# Patient Record
Sex: FEMALE
[De-identification: names, ages, dates, MRNs, and addresses within clinical notes are randomized per-mention and may not be internally consistent; named-entity substitution may affect disease eponyms.]

---

## 2018-05-07 PROBLEM — Z00.00 ENCOUNTER FOR PREVENTIVE HEALTH EXAMINATION: Status: ACTIVE | Noted: 2018-05-07

## 2018-05-31 ENCOUNTER — APPOINTMENT (OUTPATIENT)
Dept: OPHTHALMOLOGY | Facility: CLINIC | Age: 53
End: 2018-05-31

## 2025-03-15 ENCOUNTER — EMERGENCY (EMERGENCY)
Facility: HOSPITAL | Age: 60
LOS: 1 days | Discharge: HOME | End: 2025-03-15
Attending: STUDENT IN AN ORGANIZED HEALTH CARE EDUCATION/TRAINING PROGRAM
Payer: COMMERCIAL

## 2025-03-15 ENCOUNTER — NON-APPOINTMENT (OUTPATIENT)
Age: 60
End: 2025-03-15

## 2025-03-15 VITALS
WEIGHT: 199.96 LBS | SYSTOLIC BLOOD PRESSURE: 111 MMHG | HEART RATE: 80 BPM | DIASTOLIC BLOOD PRESSURE: 78 MMHG | HEIGHT: 61.5 IN | TEMPERATURE: 98 F | OXYGEN SATURATION: 100 % | RESPIRATION RATE: 18 BRPM

## 2025-03-15 VITALS
OXYGEN SATURATION: 99 % | DIASTOLIC BLOOD PRESSURE: 67 MMHG | HEART RATE: 73 BPM | RESPIRATION RATE: 17 BRPM | TEMPERATURE: 98 F | SYSTOLIC BLOOD PRESSURE: 126 MMHG

## 2025-03-15 LAB
ALBUMIN SERPL ELPH-MCNC: 4.1 G/DL — SIGNIFICANT CHANGE UP (ref 3.3–5)
ALP SERPL-CCNC: 77 U/L — SIGNIFICANT CHANGE UP (ref 40–120)
ALT FLD-CCNC: 16 U/L — SIGNIFICANT CHANGE UP (ref 10–45)
ANION GAP SERPL CALC-SCNC: 13 MMOL/L — SIGNIFICANT CHANGE UP (ref 5–17)
APTT BLD: 42.3 SEC — HIGH (ref 24.5–35.6)
AST SERPL-CCNC: 26 U/L — SIGNIFICANT CHANGE UP (ref 10–40)
BASOPHILS # BLD AUTO: 0.06 K/UL — SIGNIFICANT CHANGE UP (ref 0–0.2)
BASOPHILS NFR BLD AUTO: 0.8 % — SIGNIFICANT CHANGE UP (ref 0–2)
BILIRUB SERPL-MCNC: 0.2 MG/DL — SIGNIFICANT CHANGE UP (ref 0.2–1.2)
BUN SERPL-MCNC: 15 MG/DL — SIGNIFICANT CHANGE UP (ref 7–23)
CALCIUM SERPL-MCNC: 9.5 MG/DL — SIGNIFICANT CHANGE UP (ref 8.4–10.5)
CHLORIDE SERPL-SCNC: 104 MMOL/L — SIGNIFICANT CHANGE UP (ref 96–108)
CO2 SERPL-SCNC: 20 MMOL/L — LOW (ref 22–31)
CREAT SERPL-MCNC: 0.67 MG/DL — SIGNIFICANT CHANGE UP (ref 0.5–1.3)
EGFR: 101 ML/MIN/1.73M2 — SIGNIFICANT CHANGE UP
EGFR: 101 ML/MIN/1.73M2 — SIGNIFICANT CHANGE UP
EOSINOPHIL # BLD AUTO: 0.19 K/UL — SIGNIFICANT CHANGE UP (ref 0–0.5)
EOSINOPHIL NFR BLD AUTO: 2.5 % — SIGNIFICANT CHANGE UP (ref 0–6)
GLUCOSE SERPL-MCNC: 117 MG/DL — HIGH (ref 70–99)
HCT VFR BLD CALC: 43.9 % — SIGNIFICANT CHANGE UP (ref 34.5–45)
HGB BLD-MCNC: 15 G/DL — SIGNIFICANT CHANGE UP (ref 11.5–15.5)
IMM GRANULOCYTES NFR BLD AUTO: 0.1 % — SIGNIFICANT CHANGE UP (ref 0–0.9)
INR BLD: 0.91 RATIO — SIGNIFICANT CHANGE UP (ref 0.85–1.16)
LYMPHOCYTES # BLD AUTO: 3.02 K/UL — SIGNIFICANT CHANGE UP (ref 1–3.3)
LYMPHOCYTES # BLD AUTO: 39.3 % — SIGNIFICANT CHANGE UP (ref 13–44)
MCHC RBC-ENTMCNC: 31.5 PG — SIGNIFICANT CHANGE UP (ref 27–34)
MCHC RBC-ENTMCNC: 34.2 G/DL — SIGNIFICANT CHANGE UP (ref 32–36)
MCV RBC AUTO: 92.2 FL — SIGNIFICANT CHANGE UP (ref 80–100)
MONOCYTES # BLD AUTO: 0.31 K/UL — SIGNIFICANT CHANGE UP (ref 0–0.9)
MONOCYTES NFR BLD AUTO: 4 % — SIGNIFICANT CHANGE UP (ref 2–14)
NEUTROPHILS # BLD AUTO: 4.09 K/UL — SIGNIFICANT CHANGE UP (ref 1.8–7.4)
NEUTROPHILS NFR BLD AUTO: 53.3 % — SIGNIFICANT CHANGE UP (ref 43–77)
NRBC BLD AUTO-RTO: 0 /100 WBCS — SIGNIFICANT CHANGE UP (ref 0–0)
PLATELET # BLD AUTO: 311 K/UL — SIGNIFICANT CHANGE UP (ref 150–400)
POTASSIUM SERPL-MCNC: 5 MMOL/L — SIGNIFICANT CHANGE UP (ref 3.5–5.3)
POTASSIUM SERPL-SCNC: 5 MMOL/L — SIGNIFICANT CHANGE UP (ref 3.5–5.3)
PROT SERPL-MCNC: 7.1 G/DL — SIGNIFICANT CHANGE UP (ref 6–8.3)
PROTHROM AB SERPL-ACNC: 10.5 SEC — SIGNIFICANT CHANGE UP (ref 9.9–13.4)
RBC # BLD: 4.76 M/UL — SIGNIFICANT CHANGE UP (ref 3.8–5.2)
RBC # FLD: 13.4 % — SIGNIFICANT CHANGE UP (ref 10.3–14.5)
SODIUM SERPL-SCNC: 137 MMOL/L — SIGNIFICANT CHANGE UP (ref 135–145)
TROPONIN T, HIGH SENSITIVITY RESULT: <6 NG/L — SIGNIFICANT CHANGE UP (ref 0–51)
WBC # BLD: 7.68 K/UL — SIGNIFICANT CHANGE UP (ref 3.8–10.5)
WBC # FLD AUTO: 7.68 K/UL — SIGNIFICANT CHANGE UP (ref 3.8–10.5)

## 2025-03-15 RX ORDER — ERYTHROMYCIN 5 MG/G
1 OINTMENT OPHTHALMIC ONCE
Refills: 0 | Status: COMPLETED | OUTPATIENT
Start: 2025-03-15 | End: 2025-03-15

## 2025-03-15 RX ORDER — PREDNISONE 20 MG/1
60 TABLET ORAL ONCE
Refills: 0 | Status: COMPLETED | OUTPATIENT
Start: 2025-03-15 | End: 2025-03-15

## 2025-03-15 RX ORDER — ERYTHROMYCIN 5 MG/G
1 OINTMENT OPHTHALMIC
Qty: 1 | Refills: 0
Start: 2025-03-15 | End: 2025-03-19

## 2025-03-15 RX ORDER — PREDNISONE 20 MG/1
3 TABLET ORAL
Qty: 12 | Refills: 0
Start: 2025-03-15 | End: 2025-03-18

## 2025-03-15 RX ADMIN — ERYTHROMYCIN 1 APPLICATION(S): 5 OINTMENT OPHTHALMIC at 14:50

## 2025-03-15 RX ADMIN — Medication 1000 MILLIGRAM(S): at 14:50

## 2025-03-15 RX ADMIN — PREDNISONE 60 MILLIGRAM(S): 20 TABLET ORAL at 14:50

## 2025-03-15 NOTE — CONSULT NOTE ADULT - ASSESSMENT
INCOMPLETE    Assessment: ***. Initial VS in ED: ***. Exam: ***.      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    Recommendations: INCOMPLETE  [] MRI brain without contrast INCOMPLETE  [] TTE w/ bubble   [] Lipid panel, HbA1c  [] CBC, CMP, coagulation panel, troponin  [] ASA 81 mg PO (325 per rectum if fails dysphagia) INCOMPLETE  [] Atorvastatin 80mg (titrate to LDL < 70) or rosuvastatin equivalent  [] DVT prophylaxis per primary team  [] NPO unless passes dysphagia screen; swallow eval if fails  [] Keep BP permissive up to 220/110 for 48 hours followed by gradual normotension over 2-3 days   [] Telemonitoring  [] Neurological checks and vital signs per unit protocol  [] BGM goals 140-180  [] PT/OT; S/S evaluation    * Case and plan not final until Attending attestation * INCOMPLETE    Assessment: ***. Initial VS in ED: ***. Exam: ***.      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 House-Brackmann III.    Recommendations:   [] Can check for lyme, HSV   [] Treatment of Bell's Palsy per primary team  [] Can do prednisone 60-80mg per day x 7d  [] use artificial tear drops during the day  [] use ointment and tape the eyelid closed when going to sleep  [] Rest of care per primary team  [] No further inpatient neuro workup at this time    Case and plan discussed with stroke fellow.   * Case and plan not final until Attending attestation * Assessment:   59y (1965) woman, RH, with hx HTN, HLD, who presents to the ED for worsening R facial droop. Patient reports on Thursday night, she felt her taste was off. Yesterday 3/15/25 at 1600, she began to notice her right side lower face was not moving as well as usual. She came in to the ED today because she felt the facial droop was worsening. Patient reports she never had these symptoms in the past. She denies headaches, changes in vision, smell, hearing, taste, sore throat, runny nose, chest pain, pain in the stomach or back, pain in the arms or legs, weakness in the arms or legs, difficulty walking, fevers, chills, nausea, vomiting, diarrhea, constipation, changes in urination. Patient takes amlodipine and rosuvastatin at home. She smokes 1 ppd, since 40 years. No recent travel.     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 House-Brackmann III.    Recommendations:   [] Can check for lyme, HSV   [] Treatment of Bell's Palsy per primary team  [] Can do prednisone 60-80mg per day x 7d  [] use artificial tear drops during the day  [] use ointment and tape the eyelid closed when going to sleep  [] Rest of care per primary team (thyromegaly seen on imaging)  [] No further inpatient neuro workup at this time    Case and plan discussed with stroke fellow.   * Case and plan not final until Attending attestation *

## 2025-03-15 NOTE — ED PROVIDER NOTE - OBJECTIVE STATEMENT
59Y F PMH HTN, HLD, smoker presenting with facial droop. Patient reports that she noticed change in taste sensation on R side of tongue along with mild dysarthria last night. This morning felt that entire R side of mouth was numb with R sided facial droop. No headaches or vision changes. Denies recent illness or sick contacts.

## 2025-03-15 NOTE — ED PROVIDER NOTE - PHYSICAL EXAMINATION
awake and alert oriented x3, has CTAB, pt w/ R sided decreased blink, pt w/ facial weakness w/ droop on the R side, pt w/ EOMI, pupils are 4 mm and reactive, pt w/ no visual field defect, pt w/ equal sensation in the face, 5/5 upper and lower extremity strength, intact sensation in b/l arms/legs, pt w/ intact gait

## 2025-03-15 NOTE — ED ADULT NURSE NOTE - OBJECTIVE STATEMENT
60 y/o F with PMH of daily smoker, HTN, presents to the ED with complaints of R facial droop since yesterday 4pm. Pt denies numbness and tingling, weakness, chest pain, SOB. Pt A&Ox3 gross neuro intact, no difficulty speaking in complete sentences, pulses x 4, bowling x4, abdomen soft nontender nondistended, skin intact. side rails up and bed in lowest position

## 2025-03-15 NOTE — ED PROVIDER NOTE - PROGRESS NOTE DETAILS
reviewed test results w/ patient informed by nursing that they marcela the wrong tube color for the testing for HSV and lyme pt called and she states that she will return for testing reviewed test results w/ patient. pt aware of findings need to follow up with thyroid mass, abnormal EKG, and osteoarthritis

## 2025-03-15 NOTE — CONSULT NOTE ADULT - SUBJECTIVE AND OBJECTIVE BOX
Neurology - Consult Note    -  Spectra: [phone] ([facility]), [phone] ([facility])  -    HPI: Patient RENATA ADLER is a 59y (1965) wo/man with a PMHx significant for ***      Review of Systems:  INCOMPLETE   CONSTITUTIONAL: No fevers or chills  EYES AND ENT: No visual changes or no throat pain   NECK: No pain or stiffness  RESPIRATORY: No hemoptysis or shortness of breath  CARDIOVASCULAR: No chest pain or palpitations  GASTROINTESTINAL: No melena or hematochezia  GENITOURINARY: No dysuria or hematuria  NEUROLOGICAL: +As stated in HPI above  SKIN: No itching, burning, rashes, or lesions   All other review of systems is negative unless indicated above.    Allergies:  No Known Allergies      PMHx/PSHx/Family Hx: As above, otherwise see below       Social Hx:  No current use of tobacco, alcohol, or illicit drugs  Lives with ***    Medications:  MEDICATIONS  (STANDING):    MEDICATIONS  (PRN):      Vitals:  T(C): 36.5 (03-15-25 @ 13:12), Max: 36.5 (03-15-25 @ 13:12)  HR: 80 (03-15-25 @ 13:12) (80 - 80)  BP: 111/78 (03-15-25 @ 13:12) (111/78 - 111/78)  RR: 18 (03-15-25 @ 13:12) (18 - 18)  SpO2: 100% (03-15-25 @ 13:12) (100% - 100%)    Physical Examination: INCOMPLETE  General - NAD  Cardiovascular - Peripheral pulses palpable, no edema  Eyes - Fundoscopy with flat, sharp optic discs and no hemorrhage or exudates; Fundoscopy not well visualized; Fundoscopy not performed due to safety precautions in the setting of the COVID-19 pandemic    Neurologic Exam:  Mental status - Awake, Alert, Oriented to person, place, and time. Speech fluent, repetition and naming intact. Follows simple and complex commands. Attention/concentration, recent and remote memory (including registration and recall), and fund of knowledge intact    Cranial nerves - PERRLA, VFF, EOMI, face sensation (V1-V3) intact b/l, facial strength intact without asymmetry b/l, hearing intact b/l, palate with symmetric elevation, trapezius OR sternocleidomastiod 5/5 strength b/l, tongue midline on protrusion with full lateral movement    Motor - Normal bulk and tone throughout. No pronator drift.  Strength testing            Deltoid      Biceps      Triceps     Wrist Extension    Wrist Flexion     Interossei         R            5                 5               5                     5                              5                        5                 5  L             5                 5               5                     5                              5                        5                 5              Hip Flexion    Hip Extension    Knee Flexion    Knee Extension    Dorsiflexion    Plantar Flexion  R              5                           5                       5                           5                            5                          5  L              5                           5                        5                           5                            5                          5    Sensation - Light touch/temperature OR pain/vibration intact throughout    DTR's -             Biceps      Triceps     Brachioradialis      Patellar    Ankle    Toes/plantar response  R             2+             2+                  2+                       2+            2+                 Down  L              2+             2+                 2+                        2+           2+                 Down    Coordination - Finger to Nose intact b/l. No tremors appreciated    Gait and station - Normal casual gait. Romberg (-)    Labs:          CAPILLARY BLOOD GLUCOSE      POCT Blood Glucose.: 125 mg/dL (15 Mar 2025 13:16)          CSF:                  Radiology:     Neurology - Consult Note    -  Spectra: [phone] ([facility]), [phone] ([facility])  -    HPI: Patient RENATA ADLER is a 59y (1965) woman, RH, with hx HTN, HLD, who presents to the ED for worsening R facial droop. Patient reports on Thursday night, she felt her taste was off. Yesterday 3/15/25 at 1600, she began to notice her right side lower face was not moving as well as usual. She came in to the ED today because she felt the facial droop was worsening. Patient reports she never had these symptoms in the past. She denies headaches, changes in vision, smell, hearing, taste, sore throat, runny nose, chest pain, pain in the stomach or back, pain in the arms or legs, weakness in the arms or legs, difficulty walking, fevers, chills, nausea, vomiting, diarrhea, constipation, changes in urination. Patient takes amlodipine and rosuvastatin at home. She smokes 1 ppd, since 40 years. No recent travel.     Review of Systems:   CONSTITUTIONAL: No fevers or chills  EYES AND ENT: No visual changes or no throat pain   NECK: No pain or stiffness  RESPIRATORY: No hemoptysis or shortness of breath  CARDIOVASCULAR: No chest pain or palpitations  GASTROINTESTINAL: No melena or hematochezia  GENITOURINARY: No dysuria or hematuria  NEUROLOGICAL: +As stated in HPI above  SKIN: No itching, burning, rashes, or lesions   All other review of systems is negative unless indicated above.    Allergies:  No Known Allergies      PMHx/PSHx/Family Hx: As above, otherwise see below       Social Hx:  Smokes 40 pack years    Medications:  MEDICATIONS  (STANDING):    MEDICATIONS  (PRN):      Vitals:  T(C): 36.5 (03-15-25 @ 13:12), Max: 36.5 (03-15-25 @ 13:12)  HR: 80 (03-15-25 @ 13:12) (80 - 80)  BP: 111/78 (03-15-25 @ 13:12) (111/78 - 111/78)  RR: 18 (03-15-25 @ 13:12) (18 - 18)  SpO2: 100% (03-15-25 @ 13:12) (100% - 100%)    Physical Examination:   General - NAD     Neurologic Exam:  Mental status - Awake, Alert, Oriented to person, place, and time. Speech fluent, repetition and naming intact. Follows simple  commands. Attention intact    Cranial nerves - PERRLA, VFF, EOMI, face sensation (V1-V3) intact b/l, facial strength significant R lower face weakness at rest/while talking or smiling, slight R upper face weakness on eyebrow raise , hearing impaired somewhat on the R, palate with symmetric elevation, trapezius  5/5 strength b/l, tongue midline on protrusion with full lateral movement    Motor - Normal bulk and tone throughout. No pronator drift.  5/5 b/l  LE 5/5 b/l throughout    Sensation - Light touch intact throughout    DTR's - deferred    Coordination - Finger to Nose intact b/l. No tremors appreciated    Gait and station - Normal casual gait. Normal tiptoe and heel walk.    Labs:          CAPILLARY BLOOD GLUCOSE      POCT Blood Glucose.: 125 mg/dL (15 Mar 2025 13:16)          CSF:                  Radiology:    CTH/CTA/CTP  1. No acute intracranial hemorrhage.  2. No retrievable clot or hemodynamically significant stenoses.  3. MRI would be more sensitive for acute ischemia.  4. Cervical degenerative changes and left thyroid nodule.  There is thyromegaly with a vague left thyroid mass measuring less than 1.7 x 1.5 x 1.8 cm, although the dimensions of the lesion are somewhat arbitrary due to its indistinct margins.

## 2025-03-15 NOTE — ED PROVIDER NOTE - PATIENT PORTAL LINK FT
You can access the FollowMyHealth Patient Portal offered by [facility] by registering at the following website: [email or web address]. By joining [facility] FollowMyHealth portal, you will also be able to view your health information using other applications (apps) compatible with our system.

## 2025-03-15 NOTE — ED PROVIDER NOTE - NSFOLLOWUPCLINICS_GEN_ALL_ED_FT
[facility] - Ophthalmology  Ophthalmology  [address], [address]  [place] [address]  Phone: [phone]  Fax:

## 2025-03-15 NOTE — ED PROVIDER NOTE - ATTENDING CONTRIBUTION TO CARE
59 F w/ hx of HTN, HLD smoker, presents to the ER w/ R facial droop change in sensation in food taste, w/ diminished blinking ability on the R. Pt w/ no cp, no sob, no nausea no vomiting, no headache. On exam, pt is awake and alert oriented x3, has CTAB, pt w/ 59 F w/ hx of HTN, HLD smoker, presents to the ER w/ R facial droop change in sensation in food taste, w/ diminished blinking ability on the R. Pt w/ no cp, no sob, no nausea no vomiting, no headache. On exam, pt is awake and alert oriented x3, has CTAB, pt w/ R sided decreased blink, pt w/ facial weakness w/ droop on the R side, pt w/ EOMI, pupils are 4 mm and reactive, pt w/ no visual field defect, pt w/ equal sensation in the face, 5/5 upper and lower extremity strength, intact sensation in b/l arms/legs, pt w/ intact gait. pt was initially made a code stroke, findings likely c/w w/ bells palsy. plan for labs and imaging w/ reassessment likely isolated bells palsy, low suspicion for acute ischemic stroke, neurology agrees

## 2025-03-15 NOTE — ED ADULT NURSE NOTE - NSFALLUNIVINTERV_ED_ALL_ED
Bed/Stretcher in lowest position, wheels locked, appropriate side rails in place/Call bell, personal items and telephone in reach/Instruct patient to call for assistance before getting out of bed/chair/stretcher/Non-slip footwear applied when patient is off stretcher/[place] to call system/Physically safe environment - no spills, clutter or unnecessary equipment/Purposeful proactive rounding/Room/bathroom lighting operational, light cord in reach

## 2025-03-15 NOTE — ED PROVIDER NOTE - NSFOLLOWUPINSTRUCTIONS_ED_ALL_ED_FT
Your EKG is abnormal. You should follow up with your cardiologist. We gave you a copy of your EKG.   We also see a lesion in the thyroid. Please follow up with your primary care doctor.   Additionally, we recommend that you take the valcyclovir, prednsineon and the erythromycin. as prescribed.   Stay hydrated Your EKG is abnormal. You should follow up with your cardiologist. We gave you a copy of your EKG.   We also see a lesion in the thyroid. Please follow up with your primary care doctor.   Additionally, we recommend that you take the valcyclovir, prednisone and the erythromycin. as prescribed.   Stay hydrated  please follow up with opthalmology and your primary care doctor in the next 5-7 days.

## 2025-03-16 LAB
B BURGDOR C6 AB SER-ACNC: NEGATIVE — SIGNIFICANT CHANGE UP
B BURGDOR IGG+IGM SER-ACNC: 0.11 INDEX — SIGNIFICANT CHANGE UP (ref 0.01–0.9)
HSV 1/2 SOURCE: SIGNIFICANT CHANGE UP
HSV1 DNA BLD QL NAA+PROBE: SIGNIFICANT CHANGE UP
HSV2 DNA BLD QL NAA+PROBE: SIGNIFICANT CHANGE UP

## 2025-04-28 ENCOUNTER — NON-APPOINTMENT (OUTPATIENT)
Age: 60
End: 2025-04-28

## 2025-04-28 ENCOUNTER — APPOINTMENT (OUTPATIENT)
Dept: OPHTHALMOLOGY | Facility: CLINIC | Age: 60
End: 2025-04-28
Payer: COMMERCIAL